# Patient Record
Sex: MALE | Race: OTHER | Employment: FULL TIME | URBAN - METROPOLITAN AREA
[De-identification: names, ages, dates, MRNs, and addresses within clinical notes are randomized per-mention and may not be internally consistent; named-entity substitution may affect disease eponyms.]

---

## 2023-01-11 ENCOUNTER — OFFICE VISIT (OUTPATIENT)
Dept: ORTHOPEDIC SURGERY | Age: 33
End: 2023-01-11
Payer: COMMERCIAL

## 2023-01-11 VITALS — WEIGHT: 155 LBS | HEIGHT: 68 IN | BODY MASS INDEX: 23.49 KG/M2

## 2023-01-11 DIAGNOSIS — M43.16 SPONDYLOLISTHESIS OF LUMBAR REGION: ICD-10-CM

## 2023-01-11 DIAGNOSIS — M51.36 DDD (DEGENERATIVE DISC DISEASE), LUMBAR: Primary | ICD-10-CM

## 2023-01-11 DIAGNOSIS — M54.16 LUMBAR RADICULOPATHY: ICD-10-CM

## 2023-01-11 DIAGNOSIS — R29.898 LEFT LEG WEAKNESS: ICD-10-CM

## 2023-01-11 PROCEDURE — 99214 OFFICE O/P EST MOD 30 MIN: CPT | Performed by: PHYSICIAN ASSISTANT

## 2023-01-11 RX ORDER — CETIRIZINE HYDROCHLORIDE 10 MG/1
10 TABLET ORAL DAILY
COMMUNITY

## 2023-01-11 NOTE — LETTER
Daniel Sheriff       1990         35 y.o.     Last seen at [facility]_______________       Imaging_____________________  Don Ulloa PA-C  Referring Physician:                                                                 Chief Complaint:          Character/Association:      Pain worsened by: Standing   Sitting   Walking   Getting Up   Stairs   Laying                                        Night   Cough/Sneeze      Treatment to date: NSAID   PT   Chiro   MARCEL   SNRB   Facet   PO steroids                                                                               Steroid shot           MR      Previous Spine Surgery:                                             Previous Ortho Surgery:    Occupation/Activities:                                                 PMH:                                                                                  Gait:   Mild / Mod / Severe Antalgic   Forward Flexed   Myelopathic  Shuffle  Cane  Walker                                                   Spine:                             Alignment:                            Tenderness:                                                Hip:  IR _____  ER _____ Flexion _____   Bursal Tenderness:            Pelvic unlevel      Knee:    ROM       Right __________  Left __________  Alignment __________                 Effusion                  Crepitance                    Patella Grind               Stability      Neuro:      DTR      SLR      Clonus      Babinski         Martinez      Tinels Spurlings       Motor:                                                                         Sensory:  ***

## 2023-01-11 NOTE — LETTER
Kia Damico       1990         35 y.o.     Last seen at [facility]_______________       Imaging_____________________  Orquidea Moscoso PA-C  Referring Physician:                                                                 Chief Complaint:          Character/Association:      Pain worsened by: Standing   Sitting   Walking   Getting Up   Stairs   Laying                                        Night   Cough/Sneeze      Treatment to date: NSAID   PT   Chiro   MARCEL   SNRB   Facet   PO steroids                                                                               Steroid shot           MR      Previous Spine Surgery:                                             Previous Ortho Surgery:    Occupation/Activities:                                                 PMH:                                                                                  Gait:   Mild / Mod / Severe Antalgic   Forward Flexed   Myelopathic  Shuffle  Cane  Walker                                                   Spine:                             Alignment:                            Tenderness:                                                Hip:  IR _____  ER _____ Flexion _____   Bursal Tenderness:            Pelvic unlevel      Knee:    ROM       Right __________  Left __________  Alignment __________                 Effusion                  Crepitance                    Patella Grind               Stability      Neuro:      DTR      SLR      Clonus      Babinski         Martinez      Tinels        Spurlings       Motor:                                                                         SensoryBonnell Sidle                                                     SUSIE RUIZ  1990  MRN [id number]                                              ROOM NUMBER______      Radiographic Studies:    Cervical MRI      Thoracic MRI         Lumbar MRI          Pelvis MRI        CONTRAST    CT Myelogram: _______________   NCS/EMG ________________ ( UE  / LE )     MRI of ___________________          Other: ____________________      Injections:    KNEE    HIP  Depomedrol _____ mg Euflexxa _____    _______________ TFESI/SNRB  _______________ SI Joint  _______________ MARCEL    _______________ Facet  _______________Piriformis/ Sciatica      Medications:    Oral Steroids _______________  NSAIDS _______________    Muscle Relaxers _______________  Neurontin/Lyrica _______________    Pain Medicine _______________  Other _______________                       Physical Therapy:    Lumbar     Thoracic      Cervical     Hip       Knee       Shoulder               Traction          Ultrasound          Dry Needling      Referral:    Pain referral:  Chel Sierra   [facility]   Other: ______________________________    Follow-up/ Refer__________________________________________________    Authorization to hold blood thinners:___________________________________Antonio Henriquez       1990         35 y.o.     Last seen at [facility]_______________       Imaging_____________________  Kelley Hedrick PA-C  Referring Physician:                                                                 Chief Complaint:          Character/Association:      Pain worsened by: Standing   Sitting   Walking   Getting Up   Stairs   Laying                                        Night   Cough/Sneeze      Treatment to date: NSAID   PT   Chiro   MARCEL   SNRB   Facet   PO steroids                                                                               Steroid shot           MR      Previous Spine Surgery:                                             Previous Ortho Surgery:    Occupation/Activities:                                                 PMH:                                                                                  Gait:   Mild / Mod / Severe Antalgic   Forward Flexed   Myelopathic  Shuffle  Cane  Walker                                                   Spine:                             Alignment: Tenderness:                                                Hip:  IR _____  ER _____ Flexion _____   Bursal Tenderness:            Pelvic unlevel      Knee:    ROM       Right __________  Left __________  Alignment __________                 Effusion                  Crepitance                    Patella Grind               Stability      Neuro:      DTR      SLR      Clonus      Babinski Hoffman Tinels Spurlings       Motor:                                                                         Sensory:  ***

## 2023-01-11 NOTE — PROGRESS NOTES
Name: Angelina yMers  YOB: 1990  Gender: male  MRN: [id number]    CC: Leg Pain (Bilateral buttock and leg pain)       HPI: This is a 35y.o. year old male who  has a past medical history of Asthma and Neurological disorder. .  Patient had an injury in July in which he slipped on wet concrete and did not completely fall but that somewhat of a split. Since that time has had bilateral buttock pain and pain in posterior legs. Left is greater than right. Pain radiates in the left more lateral leg in the right around to the top of the foot. He had intramuscular steroid injection, diclofenac, had oral steroids for cold in November. This has alleviated the pain. He participated in physical therapy August through December 2022. Symptoms are still persisting. History was obtained by patient    The patient denies any change in bowel or bladder function since the onset of the symptoms. he  has not had lumbar surgery in the past.     AMB PAIN ASSESSMENT 1/11/2023   Location of Pain Buttocks   Location Modifiers Left;Right   Severity of Pain 6   Quality of Pain Aching;Dull; Other (Comment)   Duration of Pain A few minutes   Frequency of Pain Intermittent   Date Pain First Started 8/10/2021   Aggravating Factors Stretching; Other (Comment); Bending   Limiting Behavior Some   Relieving Factors Other (Comment); Nsaids   Result of Injury Yes   Work-Related Injury No   Are there other pain locations you wish to document? No            ROS/Meds/PSH/PMH/FH/SH: I personally reviewed the patient's collected intake data. Below are the pertinents:    No Known Allergies      Current Outpatient Medications:     cetirizine (ZYRTEC) 10 MG tablet, Take 10 mg by mouth daily, Disp: , Rfl:     History reviewed. No pertinent surgical history. There is no problem list on file for this patient. Tobacco:  reports that he has never smoked. He does not have any smokeless tobacco history on file.   Alcohol:   Social Received notice from [facility] that nascobal is not covered. She will need injections. My chart sent to patient.     Future Appointments   Date Time Provider Melissa Ortiz   10/28/2019  7:20 AM JÚNIOR Molina [facility]   11/14/2019 10:20 AM History     Substance and Sexual Activity   Alcohol Use No        Physical Exam:   BMI: Body mass index is 23.57 kg/m². GENERAL:  Adult in no acute distress, well developed, well nourished Patient is appropriately conversant  MSK:  Examination of the lumbar spine reveals no sagittal or coronal imbalance   There is moderate tenderness to palpation along the spinous processes and paraspinal musculature. The patient ambulates with a antalgic gait. ROM of bilateral hip(s) reveals no irritability. NEURO:  Cranial nerves grossly intact. No motor deficits. Straight leg testing is positive bilateral  Sensory testing reveals intact sensation to light touch and in the distribution of the L3-S1 dermatomes bilaterally  Ankle jerk is negative for clonus    Reflexes   Right Left   Quadriceps (L4) 3 3   Achilles (S1) 3 3     Strength testing in the lower extremity reveals the following based on the 5 point grading scale:     HF (L2) H Ab (L5) KE (L3/4) ADF (L4) EHL (L5) A Ev (S1) APF (S1)   Right 5 5 5 5 4 5 5   Left 5 5 5 5 4 5 4     PSYCH:  Alert and oriented X 3. Appropriate affect. Intact judgment and insight. Radiographic Studies:     AP, lateral and spot views of the lumbar spine:  1/11/22  The lumbar spine reveals listing to the right. Sagittal view the lumbar spine reveals slight positive sagittal balance. There are some degenerative changes noted L5-S1. Possible full slight anterior listhesis. Slight concern for pars defect. X-ray impression: Degenerative changes L5-S1 with slight anterior listhesis possible pars defect. Assessment/Plan:         Diagnosis Orders   1. DDD (degenerative disc disease), lumbar  MRI LUMBAR SPINE WO CONTRAST      2. Lumbar radiculopathy  MRI LUMBAR SPINE WO CONTRAST      3. Left leg weakness  MRI LUMBAR SPINE WO CONTRAST      4.  Spondylolisthesis of lumbar region  MRI LUMBAR SPINE WO CONTRAST            This patient's clinical history and physical exam is consistent with a bilateral   L-5 and S-1 lumbar radiculopathy. His x-rays do reveal some listing to the right and subtle anterior listhesis L5-S1. I be concerned that he had a pars lysis at his injury and is now slipping with some neurogenic compression. This would best be diagnosed with MRI scan of the lumbar spine. He has exhausted conservative measures. - A MRI was ordered to delineate anatomy, confirm the diagnosis and assess the severity. 4 This is a undiagnosed new problem with uncertain prognosis    No orders of the defined types were placed in this encounter. Orders Placed This Encounter   Procedures    MRI LUMBAR SPINE WO CONTRAST            Return for MRI results [facility]. Mickey Bowen PA-C  01/11/23      Elements of this note were created using speech recognition software. As such, errors of speech recognition may be present.

## 2023-01-11 NOTE — LETTER
Bety Shepard                                                     SUSIE RUIZ  1990  MRN [id number]                                              ROOM NUMBER______      Radiographic Studies:    Cervical MRI      Thoracic MRI         Lumbar MRI          Pelvis MRI        CONTRAST    CT Myelogram: _______________   NCS/EMG ________________ ( UE  /  LE )     MRI of ___________________          Other: ____________________      Injections:    KNEE    HIP  Depomedrol _____ mg Euflexxa _____    _______________ TFESI/SNRB  _______________ SI Joint  _______________ MARCEL    _______________ Facet  _______________Piriformis/ Sciatica      Medications:    Oral Steroids _______________  NSAIDS _______________    Muscle Relaxers _______________  Neurontin/Lyrica _______________    Pain Medicine _______________  Other _______________                       Physical Therapy:    Lumbar     Thoracic      Cervical     Hip       Knee       Shoulder               Traction          Ultrasound          Dry Needling      Referral:    Pain referral:  [facility]   [facility]   Other: ______________________________    Follow-up/ Refer__________________________________________________    Authorization to hold blood thinners:___________________________________***

## 2023-02-01 ENCOUNTER — OFFICE VISIT (OUTPATIENT)
Dept: ORTHOPEDIC SURGERY | Age: 33
End: 2023-02-01
Payer: COMMERCIAL

## 2023-02-01 DIAGNOSIS — M48.062 LUMBAR STENOSIS WITH NEUROGENIC CLAUDICATION: ICD-10-CM

## 2023-02-01 DIAGNOSIS — M51.16 LUMBAR DISC HERNIATION WITH RADICULOPATHY: Primary | ICD-10-CM

## 2023-02-01 PROCEDURE — 99214 OFFICE O/P EST MOD 30 MIN: CPT | Performed by: PHYSICIAN ASSISTANT

## 2023-02-01 NOTE — LETTER
Claudia Warren                                                     SUSIE RUIZ  1990  MRN [id number]                                              ROOM NUMBER______      Radiographic Studies:    Cervical MRI      Thoracic MRI         Lumbar MRI          Pelvis MRI        CONTRAST    CT Myelogram: _______________   NCS/EMG ________________ ( UE  /  LE )     MRI of ___________________          Other: ____________________      Injections:    KNEE    HIP  Depomedrol _____ mg Euflexxa _____    _______________ TFESI/SNRB  _______________ SI Joint  _______________ MARCEL    _______________ Facet  _______________Piriformis/ Sciatica      Medications:    Oral Steroids _______________  NSAIDS _______________    Muscle Relaxers _______________  Neurontin/Lyrica _______________    Pain Medicine _______________  Other _______________                       Physical Therapy:    Lumbar     Thoracic      Cervical     Hip       Knee       Shoulder               Traction          Ultrasound          Dry Needling      Referral:    Pain referral:  [facility]   [facility]   Other: ______________________________    Follow-up/ Refer__________________________________________________    Authorization to hold blood thinners:___________________________________***

## 2023-02-01 NOTE — PROGRESS NOTES
Name: Oliver Sexton  YOB: 1990  Gender: male  MRN: [id number]    CC: Back Pain (Lumbar MRI follow up)       HPI: This is a 35y.o. year old male who  has a past medical history of Asthma and Neurological disorder. .  Patient had an injury in July in which he slipped on wet concrete and did not completely fall but that somewhat of a split. Since that time has had bilateral buttock pain and pain in posterior legs. Left is greater than right. Pain radiates in the left more lateral leg in the right around to the top of the foot. He had intramuscular steroid injection, diclofenac, had oral steroids for cold in November. This has alleviated the pain. He participated in physical therapy August through December 2022. Symptoms are still persisting. History was obtained by patient    The patient denies any change in bowel or bladder function since the onset of the symptoms. he  has not had lumbar surgery in the past.    Initial exam findings did show some weakness of the lower extremity. I ordered MRI scan to evaluate for disc herniation. He returns today to review this. AMB PAIN ASSESSMENT 1/11/2023   Location of Pain Buttocks   Location Modifiers Left;Right   Severity of Pain 6   Quality of Pain Aching;Dull; Other (Comment)   Duration of Pain A few minutes   Frequency of Pain Intermittent   Date Pain First Started 8/10/2021   Aggravating Factors Stretching; Other (Comment); Bending   Limiting Behavior Some   Relieving Factors Other (Comment); Nsaids   Result of Injury Yes   Work-Related Injury No   Are there other pain locations you wish to document? No            ROS/Meds/PSH/PMH/FH/SH: I personally reviewed the patient's collected intake data.   Below are the pertinents:    No Known Allergies      Current Outpatient Medications:     Acetaminophen (TYLENOL EXTRA STRENGTH PO), Take by mouth, Disp: , Rfl:     cetirizine (ZYRTEC) 10 MG tablet, Take 10 mg by mouth daily, Disp: , Rfl:     History reviewed. No pertinent surgical history. There is no problem list on file for this patient. Tobacco:  reports that he has never smoked. He does not have any smokeless tobacco history on file. Alcohol:   Social History     Substance and Sexual Activity   Alcohol Use No        Physical Exam:   BMI: There is no height or weight on file to calculate BMI. GENERAL:  Adult in no acute distress, well developed, well nourished Patient is appropriately conversant  MSK:  Examination of the lumbar spine reveals no sagittal or coronal imbalance   There is moderate tenderness to palpation along the spinous processes and paraspinal musculature. The patient ambulates with a antalgic gait. ROM of bilateral hip(s) reveals no irritability. NEURO:  Cranial nerves grossly intact. No motor deficits. Straight leg testing is positive bilateral  Sensory testing reveals intact sensation to light touch and in the distribution of the L3-S1 dermatomes bilaterally  Ankle jerk is negative for clonus    Reflexes   Right Left   Quadriceps (L4) 3 3   Achilles (S1) 3 3     Strength testing in the lower extremity reveals the following based on the 5 point grading scale:     HF (L2) H Ab (L5) KE (L3/4) ADF (L4) EHL (L5) A Ev (S1) APF (S1)   Right 5 5 5 5 4 5 5   Left 5 5 5 5 4 5 4     PSYCH:  Alert and oriented X 3. Appropriate affect. Intact judgment and insight. Radiographic Studies:     AP, lateral and spot views of the lumbar spine:  1/11/22  The lumbar spine reveals listing to the right. Sagittal view the lumbar spine reveals slight positive sagittal balance. There are some degenerative changes noted L5-S1. Possible full slight anterior listhesis. Slight concern for pars defect. X-ray impression: Degenerative changes L5-S1 with slight anterior listhesis possible pars defect. MRI Result (most recent):   MRI LUMBAR SPINE WO CONTRAST 01/23/2023    Addendum 1/23/2023  9:36 AM  Addendum: Conus terminalis terminates at the level of T12-L1. Narrative  EXAMINATION: MRI LUMBAR SPINE WO CONTRAST 1/23/2023 8:53 AM    ACCESSION NUMBER: [id number]    COMPARISON: Lumbar spine x-ray August 20 22    INDICATION: Other intervertebral disc degeneration, lumbar region;  Radiculopathy, lumbar region; Other symptoms and signs involving the  musculoskeletal system; Spondylolisthesis, lumbar region    TECHNIQUE: Multisequence, multiplanar MR images were obtained of the lumbar  spine without the administration of intravenous contrast.    FINDINGS:  NUMBERING: Last fully formed disc space is L5-S1. SPINAL CORD: Normal conus. The conus medullaris terminates at the    BONES: Vertebral body height is maintained. No spinal malalignment. Diffuse low  marrow signal likely red marrow. No focal marrow edema. Soft tissues: The paravertebral soft tissues are within normal limits. Level specific findings:    T12-L1: No canal or foraminal stenosis. L1-L2: No canal or foraminal stenosis. L2-L3: No canal or foraminal stenosis. L3-L4: No canal or foraminal stenosis. L4-L5: Large central disc protrusion producing severe spinal canal stenosis. No  foraminal stenosis. L5-S1: No canal or foraminal stenosis. Impression  1. L4-5 large central disc protrusion producing severe spinal canal stenosis. 2.  No significant foraminal stenosis. I have independently reviewed the MRI of the lumbar spine. He has a very large central disc herniation at L4-5 that produces severe spinal stenosis. Other levels without abnormality and healthy appearing disks. Assessment/Plan:         Diagnosis Orders   1. Lumbar disc herniation with radiculopathy        2. Lumbar stenosis with neurogenic claudication          RI scan reveals significant disc herniation L4-5 with complete effacement of the spinal canal creating severe spinal stenosis.     - Referral to spine surgeon for surgical consultation.  -Lumbar Microdiscectomy: We discussed the possibility of a microdiscectomy including a midline incision in over the low back followed by dissection to the area of the disc herniation. The nerves would be retracted to the side and the disc herniation would be trimmed along with any other structures impinging on the nerves including ligaments and bone. Once the nerve is freed the wound would be closed with suture and covered with sterile dressings. They would expect to stay in recovery for observation and be discharged the same day. Follow-up would be scheduled for 2-3 weeks and they would have restrictions including no driving for 1-2 weeks, no lifting greater than 10 lbs, no prolonged sitting, bending or twisting. We also discussed the potential risks of the surgery including, but not limited to infection, spinal fluid leak; injury to the cauda equina or peripheral nerve root resulting in weakness, numbness, or rarely bowel or bladder dysfunction; persistent back or leg symptoms, recurrence of herniation or development of instability possibly needing additional surgery;  and the risks of anesthesia. The patient voiced an understanding of these issues. The procedure that I would recommend is a L4-5 microdiscectomy. Microscope. Aristeo table with sling, 1 hour. 4 This is an acute complicated injury    No orders of the defined types were placed in this encounter. No orders of the defined types were placed in this encounter. Return for referral to spine surgeon, follow up Dr. Onofre Hernández. Oral Virgen PA-C  02/01/23      Elements of this note were created using speech recognition software. As such, errors of speech recognition may be present.

## 2023-02-01 NOTE — PATIENT INSTRUCTIONS
Lumbar Microdiscectomy or Hemilaminectomy    Who is a candidate? Most patients with disc herniations (about 70%) will get better without surgery with in six to twelve weeks. Patients with nerve impingement causing persistent leg pain after a trial of conservative treatment (steroids, physical therapy, anti-inflammatories) are considered excellent candidates for this procedure. How is this procedure done? This procedure is done through a small incision (1-2 inches) in the low back. Once the back muscles are lifted, a small hole is made in the bone covering the spinal nerves. A microscope is then used to carefully retract the nerve that is irritated and remove the underlying disc or bone that is causing impingement. How can this help me? This procedure is intended to relieve the pressure from a nerve, which is the source of the pain, numbness, or weakness you may feel in your buttock and leg. What are the success rates? This procedure is typically very effective (90-95%) in alleviating your leg and buttock symptoms. Often patients awake from surgery with an immediate relief of their leg pain. However, some symptoms such as weakness and numbness, may take longer to resolve after the operation. In rare circumstances, patients with severe weakness and numbness that existed for long periods of time before the operation may not have much improvement of their symptoms. In these cases, the surgery is done to prevent further deterioration and weakness. How long will I be in the hospital?  Most patients are sent home the same day. However, if you have a drain placed for bleeding, are having difficulty breathing or unstable blood pressure, you may need to stay overnight. What are the risks? The most common risk is a herniation of more disc material (5-15%). During the procedure only the disc material that has already been ruptured is removed. The remainder is the disc is left alone. Recurrent herniations usually occur within the first 3 months after surgery, but may occur even years later. It is usually treated with injections or a repeat microdiscectomy. The next most common complication is leak of spinal fluid (1-3%). This complication does not typically change the overall outcome of the surgery, but you may be asked to remain on bed rest for 1-2 days after the operation to allow the leak to seal over. Other less common risks include infection (<1%), nerve injury (0.1%), reaction to anesthesia, failure of the surgery to relieve symptoms, and very rarely, bowel or bladder incontinence. What do I expect after the operation? Most patients will have nearly immediate relief of pain in the leg, and will be able to return to normal activities very quickly. You should expect to have some discomfort in the back and over the incision for a couple of weeks. What restrictions will I have? For the first 3 weeks you should:  Avoid heavy lifting   Avoid excessive bending or twisting at the waist    Avoid prolonged periods of sitting    Will I need therapy? Following a microdiscectomy patients are encouraged to engage in a program of stretching, strengthening and aerobic conditioning. You should walk as much as you can for exercise. Most patients do not require a formal physical therapy or rehabilitation program.      How do I care for my wound? After the operation you will have a small dressing over your low back. Three days after the operation you may remove the outer bandages. Underneath, there will be small strips of tape directly on the skin over your incision that should not be removed. They will either fall off in the shower or may be removed after two weeks. Showering is allowed two days after the operation.       [facility]    MD Mary Post MD Amy Hunt, PA-C Luanne Moris, NP    Main Office  [address] [facility], [address] [address] [address] [facility], [facility] [address]        For Appointments at either location, please call [phone]

## 2023-02-02 ENCOUNTER — OFFICE VISIT (OUTPATIENT)
Dept: ORTHOPEDIC SURGERY | Age: 33
End: 2023-02-02
Payer: COMMERCIAL

## 2023-02-02 DIAGNOSIS — M48.062 LUMBAR STENOSIS WITH NEUROGENIC CLAUDICATION: ICD-10-CM

## 2023-02-02 DIAGNOSIS — M51.16 LUMBAR DISC HERNIATION WITH RADICULOPATHY: Primary | ICD-10-CM

## 2023-02-02 PROCEDURE — 99214 OFFICE O/P EST MOD 30 MIN: CPT | Performed by: ORTHOPAEDIC SURGERY

## 2023-02-02 NOTE — PROGRESS NOTES
Name: Aaliyah Cole  YOB: 1990  Gender: male  MRN: [id number]  Age: 35 y.o. Chief Complaint:  Radiating back and leg pain    History of Present Illness:      Patient has been followed by Miguelina Mcgarry for claudication and radiculopathy related to a large L4-L5 disc herniation. The patient returns today with persistent symptoms of lumbar claudication resulting in a significant functional decline as previously described. The symptoms are worse with simple activities of daily living including walking and standing and there has been no lasting benefit from conservative efforts including  steroid injections, diclofenac, oral steroids, physical therapy . At this point  he has daily functional limitations due to the low back and hip pain and is ready to proceed with surgical intervention. Medications:       Current Outpatient Medications:     Acetaminophen (TYLENOL EXTRA STRENGTH PO), Take by mouth, Disp: , Rfl:     cetirizine (ZYRTEC) 10 MG tablet, Take 10 mg by mouth daily, Disp: , Rfl:     Allergies:  No Known Allergies      Physical Exam:     This is a well developed well nourished male adult. Mood and affect are appropriate. Oriented to person, place, and time. Chest is clear to auscultation. Heart is regular rate and rhythm. The patient ambulates with an antalgic and crouched gait. There is diminished light touch sensation in the  bilateral  buttocks and posterior thighs. .    Strength testing in the lower extremity reveals the following based on the 5 point grading scale:     HF (L2) H Ab (L5) KE (L3/4) ADF (L4) EHL (L5) A Ev (S1) APF (S1)   Right 5 5 5 5 5 5 5   Left 5 5 5 5 5 5 5        Radiographic Studies:     MRI of the lumbar spine images were reviewed and interpreted: He has a very large disc herniation at L4-L5 occupying nearly the entire spinal canal causing severe stenosis.     Diagnosis:      ICD-10-CM    1. Lumbar disc herniation with radiculopathy M51.16       2. Lumbar stenosis with neurogenic claudication  M48.062           Assessment/Plan: This patient's clinical history and physical exam is consistent with neurogenic claudication and left-sided lumbar radiculopathy. The imaging studies are concordant with the patient's symptoms. Conservative efforts have been reasonably exhausted and the patient feels like he cannot go on with the symptoms as they are. We have discussed surgical options and now he is ready to proceed. We discussed the possibility of a hemilaminectomy. The imaging study shows multifactorial lateral recess stenosis and a hemilaminectomy is recommended to decompress the neurologic structures involved. We discussed the details of the surgery including a midline incision in over the low back followed by dissection to the area of stenosis. The nerves would be freed up by trimming any impinging structures including disc, ligaments and bone. Once the nerves are freed the wound would be closed with suture and covered with sterile dressings. The patient would expect to stay in recovery for observation and be discharged the same day. Follow-up would be scheduled for 2-3 weeks and they would have restrictions including no driving, and no lifting greater than 15 lbs until follow up with me. We also discussed the potential risks of the surgery including, but not limited to infection, spinal fluid leak and potential headaches requiring him to remain supine post-operatively; injury to the cauda equina or peripheral nerve root resulting in weakness, numbness, or very rarely bowel or bladder dysfunction; persistent back or leg symptoms, recurrence of stenosis or the development of instability possibly needing additional surgery;  blood loss and/or hematoma; and the risks of anesthesia. The patient voiced an understanding of these issues.   The surgery that I believe would be most beneficial here is a left L4-L5 hemilaminectomy including discectomy. Aristeo table with sling. Microscope.            Electronically Signed By Mohit Frazier MD     2:17 PM

## 2023-02-07 ENCOUNTER — PREP FOR PROCEDURE (OUTPATIENT)
Dept: ORTHOPEDIC SURGERY | Age: 33
End: 2023-02-07

## 2023-02-07 DIAGNOSIS — M48.062 LUMBAR STENOSIS WITH NEUROGENIC CLAUDICATION: ICD-10-CM

## 2023-02-07 DIAGNOSIS — M51.16 LUMBAR DISC HERNIATION WITH RADICULOPATHY: Primary | ICD-10-CM

## 2023-02-07 PROBLEM — M51.26 LUMBAR DISC HERNIATION: Status: ACTIVE | Noted: 2023-02-07

## 2023-02-07 PROBLEM — M54.16 LUMBAR RADICULOPATHY: Status: ACTIVE | Noted: 2023-02-07

## 2023-02-28 ENCOUNTER — HOSPITAL ENCOUNTER (OUTPATIENT)
Dept: PREADMISSION TESTING | Age: 33
Discharge: HOME | End: 2023-03-03
Payer: COMMERCIAL

## 2023-02-28 VITALS
DIASTOLIC BLOOD PRESSURE: 82 MMHG | BODY MASS INDEX: 22.88 KG/M2 | SYSTOLIC BLOOD PRESSURE: 111 MMHG | TEMPERATURE: 98.1 F | OXYGEN SATURATION: 98 % | RESPIRATION RATE: 16 BRPM | HEART RATE: 78 BPM | HEIGHT: 69 IN | WEIGHT: 154.5 LBS

## 2023-02-28 LAB
BACTERIA SPEC CULT: NORMAL
SERVICE CMNT-IMP: NORMAL

## 2023-02-28 PROCEDURE — 87641 MR-STAPH DNA AMP PROBE: CPT

## 2023-02-28 RX ORDER — ALBUTEROL SULFATE 90 UG/1
2 AEROSOL, METERED RESPIRATORY (INHALATION) EVERY 4 HOURS PRN
COMMUNITY

## 2023-02-28 ASSESSMENT — PAIN DESCRIPTION - ORIENTATION: ORIENTATION: LEFT;LOWER

## 2023-02-28 ASSESSMENT — PAIN DESCRIPTION - DESCRIPTORS: DESCRIPTORS: SHARP

## 2023-02-28 ASSESSMENT — PAIN DESCRIPTION - ONSET: ONSET: ON-GOING

## 2023-02-28 ASSESSMENT — PAIN DESCRIPTION - LOCATION: LOCATION: BACK;LEG

## 2023-02-28 ASSESSMENT — PAIN SCALES - GENERAL: PAINLEVEL_OUTOF10: 2

## 2023-02-28 NOTE — PERIOP NOTE
PLEASE CONTINUE TAKING ALL PRESCRIPTION MEDICATIONS UP TO THE DAY OF SURGERY UNLESS OTHERWISE DIRECTED BELOW. DISCONTINUE all vitamins and supplements 7 days prior to surgery. DISCONTINUE Non-Steriodal Anti-Inflammatory (NSAIDS) such as Advil and Aleve 5 days prior to surgery. Home Medications to take  the day of surgery     Albuterol (Ventolin/ Pro-air) use and bring            Home Medications   to Hold           Comments      On the day before surgery please take Acetaminophen 1000mg in the morning and then again before bed. You may substitute for Tylenol 650 mg. Please do not bring home medications with you on the day of surgery unless otherwise directed by your nurse. If you are instructed to bring home medications, please give them to your nurse as they will be administered by the nursing staff. If you have any questions, [facility] [place] [phone] or [facility] [facility] [phone]. A copy of this note was provided to the patient for reference.

## 2023-02-28 NOTE — PERIOP NOTE
Patient verified name, , and surgery as listed in [facility]. Patient provided medical/health information and PTA medications to the best of their ability.    TYPE  CASE: 1B  Orders per surgeon: were received  Labs per surgeon: MRSA. Results: pending  Labs per anesthesia protocol: None indicated.  EKG:  not required     MRSA/MSSA swab collected; pharmacy to review and dose antibiotic as appropriate.     Patient provided with and instructed on education handouts including Guide to Surgery, blood transfusions, pain management, and hand hygiene for the family and community, and [facility] brochure.     Road to Recovery Spine surgery patient guide given. Instructed on incentive spirometry with return demonstration. Patient viewed spine prehab video.     Hibiclens and instructions given per hospital policy.    Original medication prescription bottles nont visualized during patient appointment.     Patient teach back successful and patient demonstrates knowledge of instruction.

## 2023-03-06 ENCOUNTER — ANESTHESIA EVENT (OUTPATIENT)
Dept: SURGERY | Age: 33
End: 2023-03-06
Payer: COMMERCIAL

## 2023-03-07 ENCOUNTER — HOSPITAL ENCOUNTER (OUTPATIENT)
Age: 33
Setting detail: OUTPATIENT SURGERY
Discharge: HOME | End: 2023-03-07
Attending: ORTHOPAEDIC SURGERY | Admitting: ORTHOPAEDIC SURGERY
Payer: COMMERCIAL

## 2023-03-07 ENCOUNTER — ANESTHESIA (OUTPATIENT)
Dept: SURGERY | Age: 33
End: 2023-03-07
Payer: COMMERCIAL

## 2023-03-07 ENCOUNTER — APPOINTMENT (OUTPATIENT)
Dept: GENERAL RADIOLOGY | Age: 33
End: 2023-03-07
Attending: ORTHOPAEDIC SURGERY
Payer: COMMERCIAL

## 2023-03-07 VITALS
DIASTOLIC BLOOD PRESSURE: 88 MMHG | WEIGHT: 155 LBS | TEMPERATURE: 98 F | HEART RATE: 88 BPM | RESPIRATION RATE: 16 BRPM | OXYGEN SATURATION: 98 % | HEIGHT: 68 IN | SYSTOLIC BLOOD PRESSURE: 137 MMHG | BODY MASS INDEX: 23.49 KG/M2

## 2023-03-07 DIAGNOSIS — M48.062 SPINAL STENOSIS OF LUMBAR REGION WITH NEUROGENIC CLAUDICATION: ICD-10-CM

## 2023-03-07 DIAGNOSIS — M54.16 LUMBAR RADICULOPATHY: ICD-10-CM

## 2023-03-07 DIAGNOSIS — M51.26 LUMBAR DISC HERNIATION: ICD-10-CM

## 2023-03-07 PROCEDURE — 6360000002 HC RX W HCPCS: Performed by: NURSE ANESTHETIST, CERTIFIED REGISTERED

## 2023-03-07 PROCEDURE — 3600000004 HC SURGERY LEVEL 4 BASE: Performed by: ORTHOPAEDIC SURGERY

## 2023-03-07 PROCEDURE — 2720000010 HC SURG SUPPLY STERILE: Performed by: ORTHOPAEDIC SURGERY

## 2023-03-07 PROCEDURE — 2580000003 HC RX 258: Performed by: NURSE ANESTHETIST, CERTIFIED REGISTERED

## 2023-03-07 PROCEDURE — 2500000003 HC RX 250 WO HCPCS: Performed by: NURSE ANESTHETIST, CERTIFIED REGISTERED

## 2023-03-07 PROCEDURE — 3700000001 HC ADD 15 MINUTES (ANESTHESIA): Performed by: ORTHOPAEDIC SURGERY

## 2023-03-07 PROCEDURE — 3700000000 HC ANESTHESIA ATTENDED CARE: Performed by: ORTHOPAEDIC SURGERY

## 2023-03-07 PROCEDURE — 7100000001 HC PACU RECOVERY - ADDTL 15 MIN: Performed by: ORTHOPAEDIC SURGERY

## 2023-03-07 PROCEDURE — 3600000014 HC SURGERY LEVEL 4 ADDTL 15MIN: Performed by: ORTHOPAEDIC SURGERY

## 2023-03-07 PROCEDURE — 6370000000 HC RX 637 (ALT 250 FOR IP): Performed by: ORTHOPAEDIC SURGERY

## 2023-03-07 PROCEDURE — 7100000011 HC PHASE II RECOVERY - ADDTL 15 MIN: Performed by: ORTHOPAEDIC SURGERY

## 2023-03-07 PROCEDURE — 6360000002 HC RX W HCPCS: Performed by: ANESTHESIOLOGY

## 2023-03-07 PROCEDURE — 2709999900 HC NON-CHARGEABLE SUPPLY: Performed by: ORTHOPAEDIC SURGERY

## 2023-03-07 PROCEDURE — 2580000003 HC RX 258: Performed by: ANESTHESIOLOGY

## 2023-03-07 PROCEDURE — 7100000010 HC PHASE II RECOVERY - FIRST 15 MIN: Performed by: ORTHOPAEDIC SURGERY

## 2023-03-07 PROCEDURE — 6370000000 HC RX 637 (ALT 250 FOR IP): Performed by: ANESTHESIOLOGY

## 2023-03-07 PROCEDURE — 2500000003 HC RX 250 WO HCPCS: Performed by: ORTHOPAEDIC SURGERY

## 2023-03-07 PROCEDURE — 6360000002 HC RX W HCPCS: Performed by: ORTHOPAEDIC SURGERY

## 2023-03-07 PROCEDURE — 7100000000 HC PACU RECOVERY - FIRST 15 MIN: Performed by: ORTHOPAEDIC SURGERY

## 2023-03-07 RX ORDER — OXYCODONE HYDROCHLORIDE 5 MG/1
5 TABLET ORAL EVERY 6 HOURS PRN
Qty: 28 TABLET | Refills: 0 | Status: SHIPPED | OUTPATIENT
Start: 2023-03-07 | End: 2023-03-14

## 2023-03-07 RX ORDER — SODIUM CHLORIDE, SODIUM LACTATE, POTASSIUM CHLORIDE, CALCIUM CHLORIDE 600; 310; 30; 20 MG/100ML; MG/100ML; MG/100ML; MG/100ML
INJECTION, SOLUTION INTRAVENOUS CONTINUOUS
Status: DISCONTINUED | OUTPATIENT
Start: 2023-03-07 | End: 2023-03-07 | Stop reason: HOSPADM

## 2023-03-07 RX ORDER — DIPHENHYDRAMINE HYDROCHLORIDE 50 MG/ML
12.5 INJECTION INTRAMUSCULAR; INTRAVENOUS
Status: DISCONTINUED | OUTPATIENT
Start: 2023-03-07 | End: 2023-03-07 | Stop reason: HOSPADM

## 2023-03-07 RX ORDER — FENTANYL CITRATE 50 UG/ML
25 INJECTION, SOLUTION INTRAMUSCULAR; INTRAVENOUS EVERY 5 MIN PRN
Status: DISCONTINUED | OUTPATIENT
Start: 2023-03-07 | End: 2023-03-07 | Stop reason: HOSPADM

## 2023-03-07 RX ORDER — HYDROMORPHONE HYDROCHLORIDE 2 MG/ML
0.5 INJECTION, SOLUTION INTRAMUSCULAR; INTRAVENOUS; SUBCUTANEOUS EVERY 10 MIN PRN
Status: DISCONTINUED | OUTPATIENT
Start: 2023-03-07 | End: 2023-03-07 | Stop reason: HOSPADM

## 2023-03-07 RX ORDER — SODIUM CHLORIDE 0.9 % (FLUSH) 0.9 %
5-40 SYRINGE (ML) INJECTION EVERY 12 HOURS SCHEDULED
Status: DISCONTINUED | OUTPATIENT
Start: 2023-03-07 | End: 2023-03-07 | Stop reason: HOSPADM

## 2023-03-07 RX ORDER — METOCLOPRAMIDE HYDROCHLORIDE 5 MG/ML
10 INJECTION INTRAMUSCULAR; INTRAVENOUS
Status: DISCONTINUED | OUTPATIENT
Start: 2023-03-07 | End: 2023-03-07 | Stop reason: HOSPADM

## 2023-03-07 RX ORDER — OXYCODONE HYDROCHLORIDE 5 MG/1
5 TABLET ORAL
Status: COMPLETED | OUTPATIENT
Start: 2023-03-07 | End: 2023-03-07

## 2023-03-07 RX ORDER — FENTANYL CITRATE 50 UG/ML
100 INJECTION, SOLUTION INTRAMUSCULAR; INTRAVENOUS
Status: DISCONTINUED | OUTPATIENT
Start: 2023-03-07 | End: 2023-03-07 | Stop reason: HOSPADM

## 2023-03-07 RX ORDER — TRANEXAMIC ACID 100 MG/ML
INJECTION, SOLUTION INTRAVENOUS PRN
Status: DISCONTINUED | OUTPATIENT
Start: 2023-03-07 | End: 2023-03-07 | Stop reason: SDUPTHER

## 2023-03-07 RX ORDER — ONDANSETRON 2 MG/ML
INJECTION INTRAMUSCULAR; INTRAVENOUS PRN
Status: DISCONTINUED | OUTPATIENT
Start: 2023-03-07 | End: 2023-03-07 | Stop reason: SDUPTHER

## 2023-03-07 RX ORDER — SODIUM CHLORIDE, SODIUM LACTATE, POTASSIUM CHLORIDE, CALCIUM CHLORIDE 600; 310; 30; 20 MG/100ML; MG/100ML; MG/100ML; MG/100ML
INJECTION, SOLUTION INTRAVENOUS CONTINUOUS PRN
Status: DISCONTINUED | OUTPATIENT
Start: 2023-03-07 | End: 2023-03-07 | Stop reason: SDUPTHER

## 2023-03-07 RX ORDER — SCOLOPAMINE TRANSDERMAL SYSTEM 1 MG/1
1 PATCH, EXTENDED RELEASE TRANSDERMAL
Status: DISCONTINUED | OUTPATIENT
Start: 2023-03-07 | End: 2023-03-07 | Stop reason: HOSPADM

## 2023-03-07 RX ORDER — ONDANSETRON 2 MG/ML
4 INJECTION INTRAMUSCULAR; INTRAVENOUS
Status: COMPLETED | OUTPATIENT
Start: 2023-03-07 | End: 2023-03-07

## 2023-03-07 RX ORDER — VANCOMYCIN HYDROCHLORIDE 1 G/20ML
INJECTION, POWDER, LYOPHILIZED, FOR SOLUTION INTRAVENOUS PRN
Status: DISCONTINUED | OUTPATIENT
Start: 2023-03-07 | End: 2023-03-07 | Stop reason: HOSPADM

## 2023-03-07 RX ORDER — BUPIVACAINE HYDROCHLORIDE 5 MG/ML
INJECTION, SOLUTION EPIDURAL; INTRACAUDAL PRN
Status: DISCONTINUED | OUTPATIENT
Start: 2023-03-07 | End: 2023-03-07 | Stop reason: HOSPADM

## 2023-03-07 RX ORDER — FENTANYL CITRATE 50 UG/ML
INJECTION, SOLUTION INTRAMUSCULAR; INTRAVENOUS PRN
Status: DISCONTINUED | OUTPATIENT
Start: 2023-03-07 | End: 2023-03-07 | Stop reason: SDUPTHER

## 2023-03-07 RX ORDER — DEXAMETHASONE SODIUM PHOSPHATE 4 MG/ML
INJECTION, SOLUTION INTRA-ARTICULAR; INTRALESIONAL; INTRAMUSCULAR; INTRAVENOUS; SOFT TISSUE PRN
Status: DISCONTINUED | OUTPATIENT
Start: 2023-03-07 | End: 2023-03-07 | Stop reason: SDUPTHER

## 2023-03-07 RX ORDER — CEFAZOLIN SODIUM 1 G/3ML
INJECTION, POWDER, FOR SOLUTION INTRAMUSCULAR; INTRAVENOUS PRN
Status: DISCONTINUED | OUTPATIENT
Start: 2023-03-07 | End: 2023-03-07 | Stop reason: SDUPTHER

## 2023-03-07 RX ORDER — MIDAZOLAM HYDROCHLORIDE 2 MG/2ML
2 INJECTION, SOLUTION INTRAMUSCULAR; INTRAVENOUS
Status: DISCONTINUED | OUTPATIENT
Start: 2023-03-07 | End: 2023-03-07 | Stop reason: HOSPADM

## 2023-03-07 RX ORDER — ROCURONIUM BROMIDE 10 MG/ML
INJECTION, SOLUTION INTRAVENOUS PRN
Status: DISCONTINUED | OUTPATIENT
Start: 2023-03-07 | End: 2023-03-07 | Stop reason: SDUPTHER

## 2023-03-07 RX ORDER — LIDOCAINE HYDROCHLORIDE 20 MG/ML
INJECTION, SOLUTION EPIDURAL; INFILTRATION; INTRACAUDAL; PERINEURAL PRN
Status: DISCONTINUED | OUTPATIENT
Start: 2023-03-07 | End: 2023-03-07 | Stop reason: SDUPTHER

## 2023-03-07 RX ORDER — PROPOFOL 10 MG/ML
INJECTION, EMULSION INTRAVENOUS PRN
Status: DISCONTINUED | OUTPATIENT
Start: 2023-03-07 | End: 2023-03-07 | Stop reason: SDUPTHER

## 2023-03-07 RX ADMIN — SODIUM CHLORIDE, POTASSIUM CHLORIDE, SODIUM LACTATE AND CALCIUM CHLORIDE: 600; 310; 30; 20 INJECTION, SOLUTION INTRAVENOUS at 06:03

## 2023-03-07 RX ADMIN — ONDANSETRON 4 MG: 2 INJECTION INTRAMUSCULAR; INTRAVENOUS at 07:20

## 2023-03-07 RX ADMIN — PROPOFOL 150 MG: 10 INJECTION, EMULSION INTRAVENOUS at 07:13

## 2023-03-07 RX ADMIN — DEXAMETHASONE SODIUM PHOSPHATE 10 MG: 4 INJECTION, SOLUTION INTRAMUSCULAR; INTRAVENOUS at 07:20

## 2023-03-07 RX ADMIN — FENTANYL CITRATE 100 MCG: 50 INJECTION, SOLUTION INTRAMUSCULAR; INTRAVENOUS at 07:09

## 2023-03-07 RX ADMIN — Medication 3 AMPULE: at 06:11

## 2023-03-07 RX ADMIN — FENTANYL CITRATE 100 MCG: 50 INJECTION, SOLUTION INTRAMUSCULAR; INTRAVENOUS at 07:34

## 2023-03-07 RX ADMIN — CEFAZOLIN SODIUM 2 G: 1 INJECTION, POWDER, FOR SOLUTION INTRAMUSCULAR; INTRAVENOUS at 07:20

## 2023-03-07 RX ADMIN — LIDOCAINE HYDROCHLORIDE 100 MG: 20 INJECTION, SOLUTION EPIDURAL; INFILTRATION; INTRACAUDAL; PERINEURAL at 07:13

## 2023-03-07 RX ADMIN — OXYCODONE 5 MG: 5 TABLET ORAL at 08:26

## 2023-03-07 RX ADMIN — ROCURONIUM BROMIDE 50 MG: 50 INJECTION, SOLUTION INTRAVENOUS at 07:13

## 2023-03-07 RX ADMIN — TRANEXAMIC ACID 1000 MG: 100 INJECTION, SOLUTION INTRAVENOUS at 07:22

## 2023-03-07 RX ADMIN — SODIUM CHLORIDE, SODIUM LACTATE, POTASSIUM CHLORIDE, AND CALCIUM CHLORIDE: 600; 310; 30; 20 INJECTION, SOLUTION INTRAVENOUS at 07:07

## 2023-03-07 RX ADMIN — ONDANSETRON 4 MG: 2 INJECTION INTRAMUSCULAR; INTRAVENOUS at 08:27

## 2023-03-07 RX ADMIN — SUGAMMADEX 200 MG: 100 INJECTION, SOLUTION INTRAVENOUS at 08:06

## 2023-03-07 ASSESSMENT — PAIN - FUNCTIONAL ASSESSMENT
PAIN_FUNCTIONAL_ASSESSMENT: 0-10

## 2023-03-07 ASSESSMENT — PAIN SCALES - GENERAL: PAINLEVEL_OUTOF10: 5

## 2023-03-07 ASSESSMENT — PAIN DESCRIPTION - LOCATION: LOCATION: INCISION

## 2023-03-07 NOTE — ANESTHESIA POSTPROCEDURE EVALUATION
Department of Anesthesiology  Postprocedure Note    Patient: Miranda Bowden  MRN: [id number]  YOB: 1990  Date of evaluation: 3/7/2023      Procedure Summary     Date: 03/07/23 Room / Location: [facility] MAIN OR 09 / [facility] MAIN OR    Anesthesia Start: 0701 Anesthesia Stop: 9068    Procedure: left L4-L5 hemilaminectomy including discectomy isi frame (Left: Spine Lumbar) Diagnosis:       Spinal stenosis of lumbar region with neurogenic claudication      Lumbar disc herniation      Lumbar radiculopathy      (Spinal stenosis of lumbar region with neurogenic claudication [M48.062])      (Lumbar disc herniation [M51.26])      (Lumbar radiculopathy [M54.16])    Providers: David Villalobos MD Responsible Provider: Montserrat Degroot MD    Anesthesia Type: general ASA Status: 2          Anesthesia Type: No value filed.     Keyla Phase I: Keyla Score: 9    Keyla Phase II: Keyla Score: 10      Anesthesia Post Evaluation    Patient location during evaluation: PACU  Patient participation: complete - patient participated  Level of consciousness: awake and awake and alert  Airway patency: patent  Nausea & Vomiting: no nausea  Complications: no  Cardiovascular status: hemodynamically stable  Respiratory status: acceptable  Hydration status: euvolemic  Multimodal analgesia pain management approach

## 2023-03-07 NOTE — ANESTHESIA PROCEDURE NOTES
Airway  Date/Time: 3/7/2023 7:26 AM  Urgency: elective    Airway not difficult    General Information and Staff    Patient location during procedure: OR  Performed: resident/CRNA     Indications and Patient Condition  Indications for airway management: anesthesia  Spontaneous Ventilation: absent  Sedation level: deep  Preoxygenated: yes  Patient position: sniffing  MILS not maintained throughout  Mask difficulty assessment: vent by bag mask    Final Airway Details  Final airway type: endotracheal airway      Successful airway: ETT  Cuffed: yes   Successful intubation technique: direct laryngoscopy  Facilitating devices/methods: intubating stylet  Endotracheal tube insertion site: oral  Blade: Carver  Blade size: #3  ETT size (mm): 7.5  Cormack-Lehane Classification: grade IIb - view of arytenoids or posterior of glottis only  Placement verified by: chest auscultation and capnometry   Measured from: lips  Number of attempts at approach: 1  Ventilation between attempts: bag mask

## 2023-03-07 NOTE — DISCHARGE INSTRUCTIONS
Before your surgery - Spine Surgery    Activity  Begin exercising daily 2-4 weeks prior to your surgery date using a stationary bike, or vigorous walking as tolerated to improve your cardiovascular function. This will help your postoperative recovery. Medications  Start taking Miralax and a stool softener such as Colace or their generic forms 3 days prior to your date of surgery. Postoperative pain medication often includes short term narcotics use which can cause severe constipation. Hold anticoagulation medications (also known as blood-thinners) prior to surgery as  instructed, as well as all herbal or vitamin supplements. Tobacco products  DO NOT use tobacco products for at least 6 weeks prior to your surgery. This may be a requirement in order for insurance to approve surgery and you can be tested for nicotine. Discharge Instructions - Spine Surgery    Wound Care and Showering  Your wound will typically be covered with a clear mesh and glue, which is waterproof sufficient for showering but not soaking in a bath. If there is some drainage or bleeding from under the glue, the area should be covered with gauze and secured with tape or Tegaderm (purchased at a pharmacy) until the drainage stops. Tegaderm is preferable since it is waterproof and can be worn in the shower. Once the drainage stops, the outer gauze and Tegaderm dressing can be removed, while leaving the glue layer in place for 10-14 days. Hair washing is permissible while in the shower. No tub baths, hot tubs or whirlpools until seen in the office. Once the wound is healed, the glue can be removed by dissolving with a triple-antibiotic or we can remove it at your first post operative visit. If any of the following should occur, please call the office:  Fevers greater than 101 degrees that does not improve after tylenol use.   Increased redness or large amount of swelling around incision    Exercise  Walking and stair climbing privileges are unlimited including walking on a treadmill without an incline. Do NOT lift  greater than 15 lbs until otherwise instructed. Avoid lifting or reaching above your head. Sleeping  You may sleep in any comfortable position. Many patients find comfort sleeping in a recliner chair. It is normal to have difficulty sleeping for the first several weeks following your surgery. We recommend trying Benadryl, Melatonin, or Tylenol PM for help sleeping. All are over-the-counter and can be found in drugstores. Eating  Because of the tubes in your throat while asleep during surgery, it is normal to have a sore throat and some difficulty swallowing solid foods after your surgery. This may persist for several weeks. Eating soft foods like yogurt, macaroni and mashed potatoes seem to help. Pain  If you feel you need pain medicine, you may take regular or extra-strength Tylenol. If you need more than Tylenol you can also take an anti-inflammatory medication such as Advil, Aleve, or Motrin in addition to the Tylenol. Do not resume taking Fosamax for 8 weeks after your fusion surgery. To help alleviate persistent soreness around the incision and muscles, apply ice or warm moist compresses. It is common to have some increased nerve pain after surgery for weeks to sometimes months after neck or back surgery. This should resolve with time as your heal and the inflammation from surgery resolves. If you need a refill on your pain medication, please note our office is closed over the weekend. It is our office policy that on-call providers DO NOT refill narcotic pain medications over the weekend. If you will need a refill over the weekend, please call our office by Thursday. Driving  You may NOT drive a car until told otherwise by your physician.  You may be a passenger for short distances (about 20-30 minutes.) If you must take a longer trip, be sure to make several pit stops so that you can walk and stretch your legs. Reclining in the passenger seat seems to be the most comfortable position for most patients. In some states, it is illegal to drive a car while wearing a neck brace. Follow-Up Appointments  When you are discharged from the hospital, a follow up appointment will be made for 2-3 weeks from your surgery date. Either check MyChart or call [phone] to confirm your follow-up appointment.     IF YOU HAVE ANY PROBLEMS ONCE YOU ARE AT HOME CALL THE FOLLOWING NUMBERS:   Nurse's line: [phone]  Main office number: [phone]

## 2023-03-07 NOTE — OP NOTE
[address] [address]   [phone]    OPERATIVE REPORT    Patient ID:Antonio Quesada  [id number]  1990  35 y.o. DATE OF SURGERY: 3/7/2023    SURGEON: Dr. Felipa Sanderson DIAGNOSIS:  L4 - L5 stenosis and disc herniation. POSTOPERATIVE DIAGNOSIS: L4 - L5 stenosis and disc herniation. PROCEDURE:     left L4 and L5 hemilaminectomy  including discectomy. (CPT [id number], [id number])      ANESTHESIA: General.    ESTIMATED BLOOD LOSS: 10 ml    POSTOPERATIVE CONDITION: Stable. INTRAOPERATIVE COMPLICATIONS: None. INDICATION FOR PROCEDURE: The patient has a history of low back pain with episodic radiation to the right lower extremity consistent with neurogenic claudication primarily affecting the L5 nerve root. The patient has failed an extended period of observation and conservative measures. In the outpatient setting, the risks, benefits, and potential complications of the procedure were discussed and an informed consent was obtained. DESCRIPTION OF PROCEDURE: After adequate induction of general anesthesia, the patient was positioned prone on the [facility] spinal table. Care was taken to pad all bony prominences. Shoulders and elbows were placed in a 90-90 position. The abdomen was allowed to hang free to decrease intraabdominal pressure. The legs were flexed using the sling. The lumbar area was prepped and draped in the usual sterile fashion using a DuraPrep scrub. Preoperative antibiotics were administered. A time-out was called to confirm appropriate patient, proposed procedure, and proposed incision site. With this confirmation, an incision was created over the appropriate interspace. Dissection was carried down to the lumbodorsal fascia. The lumbodorsal fascia was released on the left side and dissection was carried down to the lamina and pars interarticularis.  A 000 curette was used to elevate the ligamentum flavum at its origin on the caudal surface of the L4  lamina and a Penfield number four was slipped just anterior to the lamina. C-arm fluoroscopy was brought in and used to obtain a cross table fluoroscopic image to confirm appropriate spinal level. With this confirmation, the Koreen Prows number four was removed and the area was marked with electrocautery. The operating microscope was brought to the surgical field. The ligamentum flavum was then elevated off of its insertion on the cephalad surface of the  L5  lamina. A 4 mm Kerrison was used to perform a hemilaminectomy of L4 as well as L5. The ligamentum flavum was carefully elevated off of the thecal sac and excised with the Kerrison rongeur. The descending nerve root was identified and retracted medially. The lateral recess was undercut laterally to the pedicle. A foraminotomy was performed at each level to decompress the exiting L4 and L5 nerve root. The nerve root was retracted medially again with the Alin nerve root retractor. The underlying disk was identified and inspected. A disc herniation was noted and a discectomy was also performed. The nerve was released from retraction and the area inspected and palpated with a nerve hook for adequacy of decompression. This was satisfactory. The lateral rescess was flushed with saline solution. The dorsal wound was flushed as well. The lumbodorsal fascia was approximated with a number one Vicryl suture in interrupted fashion. The skin and subcutaneous tissue were then closed in a layered fashion. Marcaine was infiltrated subcutaneously. Dermabond was applied. The patient tolerated the procedure well and was returned to the postanesthesia care unit in stable condition. At the end of the case, all sponge, needle, and instrument counts were correct. Signed: Jr Gandara.  Kyle Nichols MD

## 2023-03-07 NOTE — ANESTHESIA PRE PROCEDURE
Department of Anesthesiology  Preprocedure Note       Name:  Maggie Payment   Age:  35 y.o.  :  1990                                          MRN:  [id number]         Date:  3/7/2023      Surgeon: Dariusz Nguyen):  Azar Carlos MD    Procedure: Procedure(s):  left L4-L5 hemilaminectomy including discectomy isi frame    Medications prior to admission:   Prior to Admission medications    Medication Sig Start Date End Date Taking?  Authorizing Provider   albuterol sulfate HFA (PROVENTIL;VENTOLIN;PROAIR) 108 (90 Base) MCG/ACT inhaler Inhale 2 puffs into the lungs every 4 hours as needed for Wheezing    Historical Provider, MD   Acetaminophen (TYLENOL EXTRA STRENGTH PO) Take by mouth    Historical Provider, MD   cetirizine (ZYRTEC) 10 MG tablet Take 10 mg by mouth nightly    Historical Provider, MD       Current medications:    Current Facility-Administered Medications   Medication Dose Route Frequency Provider Last Rate Last Admin    fentaNYL (SUBLIMAZE) injection 100 mcg  100 mcg IntraVENous Once PRN Joella Romberg, MD        scopolamine (TRANSDERM-SCOP) transdermal patch 1 patch  1 patch TransDERmal Q72H Joella Romberg, MD   1 patch at 23 0601    lactated ringers IV soln infusion   IntraVENous Continuous Joella Romberg,  mL/hr at 23 0603 New Bag at 23 0603    sodium chloride flush 0.9 % injection 5-40 mL  5-40 mL IntraVENous 2 times per day Joella Romberg, MD        midazolam PF (VERSED) injection 2 mg  2 mg IntraVENous Once PRN Joella Romberg, MD        ceFAZolin (ANCEF) 2000 mg in sterile water 20 mL IV syringe  2,000 mg IntraVENous On Call to Maximino Faustin MD           Allergies:  No Known Allergies    Problem List:    Patient Active Problem List   Diagnosis Code    Spinal stenosis of lumbar region with neurogenic claudication M48.062    Lumbar disc herniation M51.26    Lumbar radiculopathy M54.16       Past Medical History: Diagnosis Date    Anxiety     does not take medication    Asthma     COVID-19 08/2022    not hospitalized    Neurological disorder     migraines       Past Surgical History:        Procedure Laterality Date    WISDOM TOOTH EXTRACTION         Social History:    Social History     Tobacco Use    Smoking status: Never    Smokeless tobacco: Not on file   Substance Use Topics    Alcohol use: No                                Counseling given: Not Answered      Vital Signs (Current):   Vitals:    03/07/23 0544   BP: 116/77   Pulse: 75   Resp: 12   Temp: 97.9 °F (36.6 °C)   TempSrc: Oral   SpO2: 98%   Weight: 155 lb (70.3 kg)   Height: 5' 8\" (1.727 m)                                              BP Readings from Last 3 Encounters:   03/07/23 116/77   02/28/23 111/82       NPO Status: Time of last liquid consumption: 0000                        Time of last solid consumption: 0000                        Date of last liquid consumption: 03/06/23                        Date of last solid food consumption: 03/06/23    BMI:   Wt Readings from Last 3 Encounters:   03/07/23 155 lb (70.3 kg)   02/28/23 154 lb 8 oz (70.1 kg)   01/11/23 155 lb (70.3 kg)     Body mass index is 23.57 kg/m². CBC: No results found for: WBC, RBC, HGB, HCT, MCV, RDW, PLT    CMP: No results found for: NA, K, CL, CO2, BUN, CREATININE, GFRAA, AGRATIO, LABGLOM, GLUCOSE, GLU, PROT, CALCIUM, BILITOT, ALKPHOS, AST, ALT    POC Tests: No results for input(s): POCGLU, POCNA, POCK, POCCL, POCBUN, POCHEMO, POCHCT in the last 72 hours.     Coags: No results found for: PROTIME, INR, APTT    HCG (If Applicable): No results found for: PREGTESTUR, PREGSERUM, HCG, HCGQUANT     ABGs: No results found for: PHART, PO2ART, LUN7CNT, SLG5RYZ, BEART, B1LNFDFC     Type & Screen (If Applicable):  No results found for: LABABO, LABRH    Drug/Infectious Status (If Applicable):  No results found for: HIV, HEPCAB    COVID-19 Screening (If Applicable): No results found for: [facility]        Anesthesia Evaluation  Patient summary reviewed and Nursing notes reviewed  Airway: Mallampati: I  TM distance: >3 FB   Neck ROM: full     Dental:          Pulmonary:Negative Pulmonary ROS and normal exam    (+) asthma:                            Cardiovascular:  Exercise tolerance: good (>4 METS),           Rhythm: regular  Rate: normal                    Neuro/Psych:   Negative Neuro/Psych ROS              GI/Hepatic/Renal: Neg GI/Hepatic/Renal ROS            Endo/Other: Negative Endo/Other ROS             Pt had no PAT visit       Abdominal:             Vascular: negative vascular ROS. Other Findings:           Anesthesia Plan      general     ASA 2       Induction: intravenous. MIPS: Postoperative opioids intended and Prophylactic antiemetics administered. Anesthetic plan and risks discussed with patient.       Plan discussed with surgical team.                    Ana María Markham MD   3/7/2023

## 2023-03-07 NOTE — PERIOP NOTE
ACTIVITY  As tolerated and as directed by your doctor. Bathe or shower as directed by your doctor. DIET  Clear liquids until no nausea or vomiting; then light diet for the first day. Advance to regular diet on second day, unless your doctor orders otherwise. If nausea and vomiting continues, call your doctor. PAIN  Take pain medication as directed by your doctor. Call your doctor if pain is NOT relieved by medication. DO NOT take aspirin of blood thinners unless directed by your doctor. CALL YOUR DOCTOR IF   Excessive bleeding that does not stop after holding pressure over the area  Temperature of 101 degrees F or above  Excessive redness, swelling or bruising, and/ or green or yellow, smelly discharge from incision    After general anesthesia or intravenous sedation, for 24 hours or while taking prescription Narcotics:  Limit your activities  A responsible adult needs to be with you for the next 24 hours  Do not drive and operate hazardous machinery  Do not make important personal or business decisions  Do not drink alcoholic beverages  If you have not urinated within 8 hours after discharge, and you are experiencing discomfort from urinary retention, please go to the nearest ED. If you have sleep apnea and have a CPAP machine, please use it for all naps and sleeping. Please use caution when taking narcotics and any of your home medications that may cause drowsiness. *  Please give a list of your current medications to your Primary Care Provider. *  Please update this list whenever your medications are discontinued, doses are      changed, or new medications (including over-the-counter products) are added. *  Please carry medication information at all times in case of emergency situations.     These are general instructions for a healthy lifestyle:  No smoking/ No tobacco products/ Avoid exposure to second hand smoke  Surgeon General's Warning:  Quitting smoking now greatly reduces serious risk to your health. Obesity, smoking, and sedentary lifestyle greatly increases your risk for illness  A healthy diet, regular physical exercise & weight monitoring are important for maintaining a healthy lifestyle    You may be retaining fluid if you have a history of heart failure or if you experience any of the following symptoms:  Weight gain of 3 pounds or more overnight or 5 pounds in a week, increased swelling in our hands or feet or shortness of breath while lying flat in bed. Please call your doctor as soon as you notice any of these symptoms; do not wait until your next office visit.

## 2023-03-07 NOTE — PERIOP NOTE
To whom it may concern: This is to certify Geena Gee is excused from work until following dates March 23rd   Please feel free to contact Dr. Essence Mahoney ([phone]) with any questions or concerns. Thank you for your assistance in this matter.     Sincerely,    Chapito Raines RN

## 2023-03-13 ENCOUNTER — TELEPHONE (OUTPATIENT)
Dept: ORTHOPEDIC SURGERY | Age: 33
End: 2023-03-13

## 2023-03-20 ENCOUNTER — TELEPHONE (OUTPATIENT)
Dept: ORTHOPEDIC SURGERY | Age: 33
End: 2023-03-20

## 2023-03-21 ENCOUNTER — TELEPHONE (OUTPATIENT)
Dept: ORTHOPEDIC SURGERY | Age: 33
End: 2023-03-21

## 2023-03-23 ENCOUNTER — OFFICE VISIT (OUTPATIENT)
Dept: ORTHOPEDIC SURGERY | Age: 33
End: 2023-03-23

## 2023-03-23 DIAGNOSIS — Z98.890 STATUS POST LUMBAR SURGERY: Primary | ICD-10-CM

## 2023-03-23 PROCEDURE — 99024 POSTOP FOLLOW-UP VISIT: CPT | Performed by: ORTHOPAEDIC SURGERY

## 2023-03-23 NOTE — PROGRESS NOTES
Name: Cathy Chatterjee  YOB: 1990  Gender: male  MRN: [id number]  Age: 35 y.o. Chief Complaint: Lumbar spine surgery follow up    History of Present Illness:      Cathy Chattrejee  is here for 2-week follow up of his  hemilaminectomy surgery. He reports significant relief of preoperative lower extremity pain, weakness and parasthesias. He does continue to have some straight leg raise on the left side. There is the expected residual back stiffness. But otherwise, he has been walking about 3 miles per day. Medications:       Current Outpatient Medications:     albuterol sulfate HFA (PROVENTIL;VENTOLIN;PROAIR) 108 (90 Base) MCG/ACT inhaler, Inhale 2 puffs into the lungs every 4 hours as needed for Wheezing, Disp: , Rfl:     Acetaminophen (TYLENOL EXTRA STRENGTH PO), Take by mouth, Disp: , Rfl:     cetirizine (ZYRTEC) 10 MG tablet, Take 10 mg by mouth nightly, Disp: , Rfl:     Allergies:    No Known Allergies      Physical Exam:      Respirations are unlabored and there is no evidence of cyanosis      Wound is healed nicely without erythema, drainage or underlying fluctuance    Gait is improved    Sensory testing reveals intact sensation to light touch and in the distribution of the L3-S1 dermatomes bilaterally. Strength testing in the lower extremity reveals the following based on the 5 point grading scale:       HF (L2) H Ab (L5) KE (L3/4) ADF (L4) EHL (L5) A Ev (S1) APF (S1)   Right 5 5 5 5 5 5 5   Left 5 5 5 5 5 5 5       Diagnosis:      ICD-10-CM    1. Status post lumbar surgery  Z98.890           Assessment/Plan: This patient is following the expected course following the spine surgery. I encouraged him to walk as much as possible for exercise. He can also begin a stationary bike, or other low impact aerobic exercise. We will limit lifting to 10 pounds for the next several weeks.  Prolonged sitting should be avoided to minimize the strain on the lumbar

## 2023-03-24 ENCOUNTER — TELEPHONE (OUTPATIENT)
Dept: ORTHOPEDIC SURGERY | Age: 33
End: 2023-03-24

## 2023-10-30 ENCOUNTER — OFFICE VISIT (OUTPATIENT)
Dept: ORTHOPEDIC SURGERY | Age: 33
End: 2023-10-30
Payer: COMMERCIAL

## 2023-10-30 DIAGNOSIS — M25.551 RIGHT HIP PAIN: ICD-10-CM

## 2023-10-30 DIAGNOSIS — S73.191A TEAR OF RIGHT ACETABULAR LABRUM, INITIAL ENCOUNTER: Primary | ICD-10-CM

## 2023-10-30 PROCEDURE — 99204 OFFICE O/P NEW MOD 45 MIN: CPT | Performed by: STUDENT IN AN ORGANIZED HEALTH CARE EDUCATION/TRAINING PROGRAM

## 2023-10-30 RX ORDER — MELOXICAM 7.5 MG/1
7.5 TABLET ORAL DAILY PRN
Qty: 30 TABLET | Refills: 0 | Status: SHIPPED | OUTPATIENT
Start: 2023-10-30

## 2023-10-30 NOTE — PROGRESS NOTES
Standing Status:   Future     Standing Expiration Date:   10/30/2024     Order Specific Question:   What is the sedation requirement? Answer:   None    meloxicam (MOBIC) 7.5 MG tablet     Sig: Take 1 tablet by mouth daily as needed for Pain     Dispense:  30 tablet     Refill:  0      Follow up: Return for MRI results. 4 This is a undiagnosed new problem with uncertain prognosis     The patient expressed understanding and agreed with the plan. Jesus Segovia MD   [facility] [address] [place] [address] [facility]     This document was created using voice recognition software so frequent mistakes are possible. For any concerns about the wording of this document, please contact its creator for further clarification.

## 2023-11-13 ENCOUNTER — OFFICE VISIT (OUTPATIENT)
Dept: ORTHOPEDIC SURGERY | Age: 33
End: 2023-11-13

## 2023-11-13 DIAGNOSIS — M25.851 HIP IMPINGEMENT SYNDROME, RIGHT: Primary | ICD-10-CM

## 2023-11-13 RX ORDER — METHYLPREDNISOLONE ACETATE 40 MG/ML
40 INJECTION, SUSPENSION INTRA-ARTICULAR; INTRALESIONAL; INTRAMUSCULAR; SOFT TISSUE ONCE
Status: COMPLETED | OUTPATIENT
Start: 2023-11-13 | End: 2023-11-13

## 2023-11-13 RX ADMIN — METHYLPREDNISOLONE ACETATE 40 MG: 40 INJECTION, SUSPENSION INTRA-ARTICULAR; INTRALESIONAL; INTRAMUSCULAR; SOFT TISSUE at 13:31
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
## 2023-11-22 ENCOUNTER — EVALUATION (OUTPATIENT)
Age: 33
End: 2023-11-22
Payer: COMMERCIAL

## 2023-11-22 DIAGNOSIS — R29.898 DECREASED STRENGTH OF TRUNK AND BACK: ICD-10-CM

## 2023-11-22 DIAGNOSIS — R29.898 IMPAIRED STRENGTH OF HIP MUSCLES: ICD-10-CM

## 2023-11-22 DIAGNOSIS — M25.851 HIP IMPINGEMENT SYNDROME, RIGHT: ICD-10-CM

## 2023-11-22 DIAGNOSIS — Z78.9 IMPAIRED MOBILITY AND ADLS: ICD-10-CM

## 2023-11-22 DIAGNOSIS — Z74.09 IMPAIRED MOBILITY AND ADLS: ICD-10-CM

## 2023-11-22 DIAGNOSIS — R68.89 DECREASED ACTIVITY TOLERANCE: ICD-10-CM

## 2023-11-22 DIAGNOSIS — M25.551 PAIN OF RIGHT HIP: Primary | ICD-10-CM

## 2023-11-22 PROCEDURE — 97110 THERAPEUTIC EXERCISES: CPT | Performed by: PHYSICAL THERAPIST

## 2023-11-22 PROCEDURE — 97162 PT EVAL MOD COMPLEX 30 MIN: CPT | Performed by: PHYSICAL THERAPIST

## 2023-11-22 NOTE — PROGRESS NOTES
confidence that these goals are attainable in the near future. Patient will verbalize that skilled PT is no longer necessary and that he/she has the knowledge base and skill w exercises to continue restoring hip function w/o formal PT. Patient will demonstrate independent skill w maintenance program.  Patient will report infrequent hip related pain of  ? 2/10 that does not limit mobility or ADLs. R hip IR will no longer be symptomatic. Patient will resume desired activities w/o provocation of R hip. Patient will demonstrate negative NU and FADIR tests on the R hip. Patient will demonstrate R SLR that is symmetrical w the L.    [employer]    Access Code: [email or web address]  URL: [email or web address]  Date: 11/22/2023  Prepared by: Kisha Nunez    Exercises  - Supine Single Bent Knee Fallout  - 2 x daily - 1 - 2 sets - 10 - 25 reps  - Supine Bridge  - 2 x daily - 1 - 2 sets - 10 - 25 reps  - Sidelying Hip Abduction  - 1 x daily - 1 - 2 sets - 10 - 25 reps  - Clamshell  - 2 x daily - 1 - 2 sets - 10 - 25 reps

## 2023-12-01 ENCOUNTER — TREATMENT (OUTPATIENT)
Age: 33
End: 2023-12-01
Payer: COMMERCIAL

## 2023-12-01 DIAGNOSIS — M25.551 PAIN OF RIGHT HIP: Primary | ICD-10-CM

## 2023-12-01 DIAGNOSIS — R68.89 DECREASED ACTIVITY TOLERANCE: ICD-10-CM

## 2023-12-01 DIAGNOSIS — Z74.09 IMPAIRED MOBILITY AND ADLS: ICD-10-CM

## 2023-12-01 DIAGNOSIS — R29.898 IMPAIRED STRENGTH OF HIP MUSCLES: ICD-10-CM

## 2023-12-01 DIAGNOSIS — Z78.9 IMPAIRED MOBILITY AND ADLS: ICD-10-CM

## 2023-12-01 DIAGNOSIS — R29.898 DECREASED STRENGTH OF TRUNK AND BACK: ICD-10-CM

## 2023-12-01 PROCEDURE — 97110 THERAPEUTIC EXERCISES: CPT | Performed by: PHYSICAL THERAPIST

## 2023-12-01 NOTE — PROGRESS NOTES
ASSESSMENT:  Pain location: R hip -     Current (0-10 pain scale): 0 - 1/10  Average Pain/symptom intensity (0-10 scale)  Last 24 hours:  0 - 1/10  Last week (1-7 days): 0 - 3/10  How often do you feel symptoms? Occasionally (26-50%)  Description: aching, dull, sharp, and 'sore', tired. Aggravating factors: prolonged standing, walking, and negotiating hills. Alleviating factors:  Rest, avoiding provoking activities     NEURO SCREEN: denies numbness, tingling, and radiating pain     SOCIAL/FUNCTIONAL HISTORY:  How would you rate your overall health? very good  Pt lives with independent spouse in a(n) 2+ story house. Current DME: none  Work Status: Employed full time: Desk and working toward [employer]   Sleep: normal  PLOF & Social Hx/Interests: Independent and active without physical limitations  Current level of function: Independent, avoids certain activities such as bike riding. Fatigued, sore w extended walking, hills. FUNCTIONAL OUTCOME QUESTIONNARE: Hip Outcome Score: 58/64= 91% function     DIAGNOSTIC EXAMS (per chart review): 10/30/23: XR  Findings: There is no significant OA of the hip. No fracture is seen. There is mild cam lesion of the right hip. Remainder of pelvis appears normal.   Impression: Signs of hip impingement, no acute osseous abnormality. 11/7/23 MRI  There are no arthritic findings of the hip. No evident labral tear is seen. There is mild edema at the greater trochanteric bursa without significant tendinosis of the gluteus medius or minimus. There is focal edema of the quadratus femoris muscle and lesser trochanter, with narrowing of the ischiofemoral interval of 8 mm. Assessment: Hip impingement syndrome, right      PATIENT STATED GOALS: To gain strength and flexibility of the R hip. SUBJECTIVE     Pt reports:  R hip is sore, achy. The hip abduction exercise really fatigues quickly. Performomg exercises 2x/day for 25 reps.   Medications: no changes since last session  Home

## 2023-12-08 ENCOUNTER — TREATMENT (OUTPATIENT)
Age: 33
End: 2023-12-08

## 2023-12-08 DIAGNOSIS — M25.551 PAIN OF RIGHT HIP: Primary | ICD-10-CM

## 2023-12-08 DIAGNOSIS — R68.89 DECREASED ACTIVITY TOLERANCE: ICD-10-CM

## 2023-12-08 DIAGNOSIS — R29.898 DECREASED STRENGTH OF TRUNK AND BACK: ICD-10-CM

## 2023-12-08 DIAGNOSIS — Z74.09 IMPAIRED MOBILITY AND ADLS: ICD-10-CM

## 2023-12-08 DIAGNOSIS — R29.898 IMPAIRED STRENGTH OF HIP MUSCLES: ICD-10-CM

## 2023-12-08 DIAGNOSIS — Z78.9 IMPAIRED MOBILITY AND ADLS: ICD-10-CM

## 2023-12-08 NOTE — PROGRESS NOTES
ASSESSMENT:  Pain location: R hip -     Current (0-10 pain scale): 0 - 1/10  Average Pain/symptom intensity (0-10 scale)  Last 24 hours:  0 - 1/10  Last week (1-7 days): 0 - 3/10  How often do you feel symptoms? Occasionally (26-50%)  Description: aching, dull, sharp, and 'sore', tired. Aggravating factors: prolonged standing, walking, and negotiating hills. Alleviating factors:  Rest, avoiding provoking activities     NEURO SCREEN: denies numbness, tingling, and radiating pain     SOCIAL/FUNCTIONAL HISTORY:  How would you rate your overall health? very good  Pt lives with independent spouse in a(n) 2+ story house. Current DME: none  Work Status: Employed full time: Desk and working toward [employer]   Sleep: normal  PLOF & Social Hx/Interests: Independent and active without physical limitations  Current level of function: Independent, avoids certain activities such as bike riding. Fatigued, sore w extended walking, hills. FUNCTIONAL OUTCOME QUESTIONNARE: Hip Outcome Score: 58/64= 91% function     DIAGNOSTIC EXAMS (per chart review): 10/30/23: XR  Findings: There is no significant OA of the hip. No fracture is seen. There is mild cam lesion of the right hip. Remainder of pelvis appears normal.   Impression: Signs of hip impingement, no acute osseous abnormality. 11/7/23 MRI  There are no arthritic findings of the hip. No evident labral tear is seen. There is mild edema at the greater trochanteric bursa without significant tendinosis of the gluteus medius or minimus. There is focal edema of the quadratus femoris muscle and lesser trochanter, with narrowing of the ischiofemoral interval of 8 mm. Assessment: Hip impingement syndrome, right      PATIENT STATED GOALS: To gain strength and flexibility of the R hip. SUBJECTIVE     Pt reports: The R hip seems more sore if I try to lie on it. The hip abduction exercise really fatigues quickly. Performing exercises 2x/day for 25 reps.   Medications: no changes

## 2024-01-08 ENCOUNTER — OFFICE VISIT (OUTPATIENT)
Dept: ORTHOPEDIC SURGERY | Age: 34
End: 2024-01-08
Payer: COMMERCIAL

## 2024-01-08 DIAGNOSIS — M25.851 HIP IMPINGEMENT SYNDROME, RIGHT: Primary | ICD-10-CM

## 2024-01-08 PROCEDURE — 99213 OFFICE O/P EST LOW 20 MIN: CPT | Performed by: STUDENT IN AN ORGANIZED HEALTH CARE EDUCATION/TRAINING PROGRAM

## 2024-01-08 NOTE — PROGRESS NOTES
any concerns about the wording of this document, please contact its creator for further clarification.

## (undated) DEVICE — RUBBERBAND FASTENING W1/8XL3IN 2 PER PK

## (undated) DEVICE — POSTERIOR LAMI VANPLT-LUCAS: Brand: MEDLINE INDUSTRIES, INC.

## (undated) DEVICE — AGENT HEMOSTATIC SURGIFLOW MATRIX KIT W/THROMBIN

## (undated) DEVICE — SOLUTION IRRIG 1000ML 0.9% SOD CHL USP POUR PLAS BTL

## (undated) DEVICE — FORCEPS BPLR L210MM TIP L1.5 WRK L105MM STR BAYNT INSUL DISP

## (undated) DEVICE — SUTURE STRATAFIX SPRL MCRYL + SZ 4-0 L12IN ABSRB UD PS-2 SXMP1B117

## (undated) DEVICE — SYSTEM SKIN CLSR 22CM DERMBND PRINEO

## (undated) DEVICE — SUTURE VCRL SZ 2-0 L27IN ABSRB UD L36MM CP-1 1/2 CIR REV J266H

## (undated) DEVICE — SUTURE VCRL SZ 1 L27IN ABSRB UD L36MM CP-1 1/2 CIR REV CUT J268H

## (undated) DEVICE — DRAPE MICSCP W51XL150IN FOR LEICA M680 WILD OHS

## (undated) DEVICE — BIPOLAR SEALER 23-112-1 AQM 6.0: Brand: AQUAMANTYS ®

## (undated) DEVICE — ABSORBENT, WATERPROOF, BACTERIA PROOF FILM DRESSING: Brand: OPSITE POST OP 9.5X8.5CM CTN 20